# Patient Record
Sex: MALE | Race: WHITE | Employment: PART TIME | URBAN - METROPOLITAN AREA
[De-identification: names, ages, dates, MRNs, and addresses within clinical notes are randomized per-mention and may not be internally consistent; named-entity substitution may affect disease eponyms.]

---

## 2018-09-21 ENCOUNTER — HOSPITAL ENCOUNTER (EMERGENCY)
Age: 18
Discharge: HOME | End: 2018-09-21
Attending: EMERGENCY MEDICINE
Payer: COMMERCIAL

## 2018-09-21 VITALS
RESPIRATION RATE: 18 BRPM | DIASTOLIC BLOOD PRESSURE: 76 MMHG | SYSTOLIC BLOOD PRESSURE: 152 MMHG | TEMPERATURE: 98.2 F | OXYGEN SATURATION: 99 % | HEART RATE: 80 BPM | WEIGHT: 130 LBS

## 2018-09-21 DIAGNOSIS — S61.214A LACERATION OF RIGHT RING FINGER WITHOUT DAMAGE TO NAIL, FOREIGN BODY PRESENCE UNSPECIFIED, INITIAL ENCOUNTER: Primary | ICD-10-CM

## 2018-09-21 PROCEDURE — 90715 TDAP VACCINE 7 YRS/> IM: CPT | Performed by: NURSE PRACTITIONER

## 2018-09-21 PROCEDURE — 90471 IMMUNIZATION ADMIN: CPT | Performed by: NURSE PRACTITIONER

## 2018-09-21 PROCEDURE — 6360000002 HC RX W HCPCS: Performed by: NURSE PRACTITIONER

## 2018-09-21 PROCEDURE — 99282 EMERGENCY DEPT VISIT SF MDM: CPT

## 2018-09-21 RX ORDER — BACITRACIN ZINC AND POLYMYXIN B SULFATE 500; 1000 [USP'U]/G; [USP'U]/G
OINTMENT TOPICAL 2 TIMES DAILY
Status: DISCONTINUED | OUTPATIENT
Start: 2018-09-21 | End: 2018-09-21 | Stop reason: HOSPADM

## 2018-09-21 RX ORDER — GINSENG 100 MG
CAPSULE ORAL
Status: DISCONTINUED
Start: 2018-09-21 | End: 2018-09-21 | Stop reason: HOSPADM

## 2018-09-21 RX ORDER — GINSENG 100 MG
CAPSULE ORAL
Status: DISCONTINUED
Start: 2018-09-21 | End: 2018-09-21 | Stop reason: WASHOUT

## 2018-09-21 RX ADMIN — TETANUS TOXOID, REDUCED DIPHTHERIA TOXOID AND ACELLULAR PERTUSSIS VACCINE, ADSORBED 0.5 ML: 5; 2.5; 8; 8; 2.5 SUSPENSION INTRAMUSCULAR at 13:49

## 2018-09-21 ASSESSMENT — ENCOUNTER SYMPTOMS: ROS SKIN COMMENTS: FINGER LACERATION

## 2018-09-21 NOTE — ED TRIAGE NOTES
Pt to ed c/o r hand lac pt dropped seram wrap and cut his hand open on a knife trying to catch it.  Pt went to [facility] and they were unable to stop the bleeding so they sent him here

## 2018-09-21 NOTE — ED PROVIDER NOTES
time. He appears well-developed and well-nourished. No distress. Musculoskeletal:   Full ROM of the right ring finger with intact sensation   Neurological: He is alert and oriented to person, place, and time. Skin: Skin is warm and dry. He is not diaphoretic. Cauterized wound/laceration to the mid volar right ring finger without active bleeding noted. Psychiatric: He has a normal mood and affect. Nursing note and vitals reviewed. ED Course     Nursing Notes, Past Medical Hx, Past Surgical Hx, Social Hx, Allergies, and Family Hx were reviewed. The patient was given the following medications:  Orders Placed This Encounter   Medications    bacitracin-polymyxin b (POLYSPORIN) ointment    Tetanus-Diphth-Acell Pertussis (BOOSTRIX) injection 0.5 mL    bacitracin 500 UNIT/GM ointment     Rockne Hose: cabinet override    DISCONTD: bacitracin 500 UNIT/GM ointment     Rockne Hose: cabinet override            CONSULTS:  None    MEDICAL DECISION MAKING / ASSESSMENT / Samantha Susan is a 19 y.o. male who presents with complaints as noted in HPI. Patient presents for reevaluation of her right ring finger laceration he sustained while at work at approximately noon today. States that at urgent care they're unable to control bleeding, so he was sent here for further evaluation. Upon my assessment, he has a wound to the mid volar aspect of the right ring finger, without active bleeding, and intact neurovascular status. No indication for reopening of the wound to evaluate further, patient states that it was cleansed at the urgent care, and cauterized there. I'm a little confused as to why he was sent here as there was no blood noted on his dressing. However PSO and an Adaptic dressing was applied today, patient will keep this dressing in place for the next 24 hours, then remove and cleanse twice daily. Given return precautions and he verbalized.     This patient was also evaluated by the attending physician. All care plans were discussed and agreed upon. Clinical Impression     1.  Laceration of right ring finger without damage to nail, foreign body presence unspecified, initial encounter        Disposition     PATIENT REFERRED TO:  [facility]  [address] [place] [address]  [phone]    If symptoms worsen      DISCHARGE MEDICATIONS:  New Prescriptions    No medications on file       Perez Stewart 14 in stable condition          Sascha Jade, DHARA - CNP  09/21/18 4857

## 2018-09-21 NOTE — ED NOTES
Patient prepared for and ready to be discharged. Patient discharged at this time in no acute distress after verbalizing understanding of discharge instructions. Patient left after receiving After Visit Summary instructions.         Eugenia Dickey RN  09/21/18 [id number]

## 2020-05-22 ENCOUNTER — OFFICE VISIT (OUTPATIENT)
Dept: PRIMARY CARE CLINIC | Age: 20
End: 2020-05-22
Payer: COMMERCIAL

## 2020-05-22 VITALS — HEART RATE: 118 BPM | TEMPERATURE: 99.1 F | OXYGEN SATURATION: 98 %

## 2020-05-22 PROCEDURE — 99212 OFFICE O/P EST SF 10 MIN: CPT | Performed by: INTERNAL MEDICINE

## 2020-05-22 NOTE — PROGRESS NOTES
5/22/2020    FLU/COVID-19 CLINIC EVALUATION    HPI  SYMPTOMS:    Symptom duration, days:  [] 1   [x] 2   [] 3   [] 4   [] 5   [] 6   [] 7   [] 8   [] 9   [] 10   [] 11   [] 12   [] 13 [] 14 +      Symptom course:   [] Worsening     [x] Stable     [] Improving    [] Fevers  [] Symptom (not measured)  [] Measured (Result: )  [] Chills  [x] Cough  [] Coughing up blood  [] Productive  [] Dry  [] Chest Congestion  [] Chest Tightness  [] Nasal Congestion  [] Runny Nose  [] Feeling short of breath  [] Fatigue  [] Chest pain  [x] Headaches  []Tolerable  [x] Severe  [] Sore throat  [] Muscle aches  [] Nausea  [] Decreased appetite  [] Vomiting  []Unable to keep fluids down  [] Diarrhea  []Severe    [] OTHER SYMPTOMS:      RISK FACTORS:  [] Pregnant or possibly pregnant  [] Age over 61  [] Diabetes  [] Heart disease  [] Asthma  [] COPD/Other chronic lung diseases  [] Active Cancer  [] On Chemotherapy  [] Taking oral steroids  [] History Lymphoma/Leukemia  [x] Close contact with a lab confirmed COVID-19 patient within 14 days of symptom onset  [] History of travel from affected geographical areas within 14 days of symptom onset  [] Health Care Worker Exposure no symptoms  [] Health Care Worker Exposure symptomatic      VITALS:  There were no vitals filed for this visit.      PHYSICAL EXAMINATION:  [x]Alert   [x]Oriented to person/place/time    [x]No apparent distress     []Toxic appearing    [x]Breathing appears normal     []Appears tachypneic         [x]Speaking in full sentences     TESTS:  POCT FLU:  [] Positive     []Negative  POCT STREP:  [] Positive     []Negative    [x] COVID-19 Test sent: [] Blue   [] Red   [] Chest X-ray     ASSESSMENT:  [] Flu  [] Strep Throat  [] Uncertain Viral Respiratory Illness  [] Possible COVID-19  [x] Exposure COVID-19  [] Other:     PLAN:  [x] Discharge home with written instructions for:  [] Flu management  [] Strep throat management  [x] Possible COVID-19 exposure with self-quarantine   [] Possible COVID-19 with isolation instructions and management of symptoms  [] Follow-up with primary care physician or emergency department if worsens  [] Referred to emergency department for evaluation    [] Evaluation per physician/nurse practitioner in clinic  [] Prescription sent in  [] No Prescription sent in    An  electronic signature was used to authenticate this note.      --Maurice Torres on 5/22/2020 at 2:05 PM

## 2020-05-24 LAB
SARS-COV-2: NOT DETECTED
SOURCE: NORMAL

## 2020-05-26 NOTE — RESULT ENCOUNTER NOTE
Please contact patient with their testing results: Your test for COVID-19, also known as novel coronavirus, came back negative. No virus was detected from the sample collected. Until your symptoms are fully resolved, you may still be contagious. We recommend that you remain isolated for 7 days minimum or 72 hours after your symptoms have completely resolved, whichever is longer. Continually monitor symptoms. Contact a medical provider if symptoms are worsening. If you have any additional questions, contact your PCP.     For additional information, please visit the Centers for Disease Control and Prevention   [email or web address]